# Patient Record
Sex: MALE | Race: AMERICAN INDIAN OR ALASKA NATIVE
[De-identification: names, ages, dates, MRNs, and addresses within clinical notes are randomized per-mention and may not be internally consistent; named-entity substitution may affect disease eponyms.]

---

## 2020-04-21 ENCOUNTER — HOSPITAL ENCOUNTER (EMERGENCY)
Dept: HOSPITAL 5 - ED | Age: 35
Discharge: LEFT BEFORE DISCHARGE | End: 2020-04-21
Payer: SELF-PAY

## 2020-04-21 VITALS — DIASTOLIC BLOOD PRESSURE: 90 MMHG | SYSTOLIC BLOOD PRESSURE: 158 MMHG

## 2020-04-21 DIAGNOSIS — Z02.79: Primary | ICD-10-CM

## 2020-04-21 PROCEDURE — 99281 EMR DPT VST MAYX REQ PHY/QHP: CPT

## 2020-04-21 NOTE — EMERGENCY DEPARTMENT REPORT
ED Medical Clearance HPI





- General


Chief complaint: Medical Clearance


Stated complaint: COLD


Time Seen by Provider: 04/21/20 10:32


Source: patient


Mode of arrival: Ambulatory





- History of Present Illness


Initial comments: 





This is a 34-year-old male nontoxic, well nourished in appearance, no acute 

signs of distress presents to the ED for a work excuse note to return to work.  

Patient stated he had some nausea but has resolved.  Patient otherwise denies 

any URI symptoms or any complaints.  Patient currently denies any symptoms and 

stated all symptoms are resolved at work is requesting for a return back to work

note.  Patient denies any chest pain, shortness of breath, fever, chills, 

nausea, vomiting, headache or stiff neck.  Patient denies any allergies or 

significant past medical history.


MD Complaint: medical clearance request


-: days(s)


Reason for Medical Clearance: other (work)


Alledged Intoxication: No


Traumatic Symptoms: denies traumatic injury


Associated Symptoms: denies other symptoms.  denies: chest pain, shortness of 

breath, palpitations, diaphoresis, confusion, cough, fever/chills, headaches, 

anorexia, malaise, nausea/vomiting, rash, seizure, syncope, weakness





ED Review of Systems


ROS: 


Stated complaint: COLD


Other details as noted in HPI





Constitutional: denies: chills, fever


Eyes: denies: eye pain, eye discharge, vision change


ENT: denies: ear pain, throat pain


Respiratory: denies: cough, shortness of breath, wheezing


Cardiovascular: denies: chest pain, palpitations


Endocrine: no symptoms reported


Gastrointestinal: denies: abdominal pain, nausea, diarrhea


Genitourinary: denies: urgency, dysuria


Musculoskeletal: denies: back pain, joint swelling, arthralgia


Skin: denies: rash, lesions


Neurological: denies: headache, weakness, paresthesias


Psychiatric: denies: anxiety, depression


Hematological/Lymphatic: denies: easy bleeding, easy bruising





ED Past Medical Hx





- Past Medical History


Previous Medical History?: No





- Surgical History


Past Surgical History?: No





- Social History


Smoking Status: Never Smoker


Substance Use Type: Alcohol





ED Physical Exam





- General


Limitations: No Limitations


General appearance: alert, in no apparent distress





- Head


Head exam: Present: atraumatic, normocephalic





- Eye


Eye exam: Present: normal appearance





- Neck


Neck exam: Present: normal inspection, full ROM.  Absent: tenderness, 

meningismus, lymphadenopathy





- Respiratory


Respiratory exam: Present: normal lung sounds bilaterally.  Absent: respiratory 

distress, wheezes, rales, rhonchi, stridor, chest wall tenderness, accessory 

muscle use, decreased breath sounds, prolonged expiratory





- Cardiovascular


Cardiovascular Exam: Present: regular rate, normal rhythm, normal heart sounds. 

Absent: bradycardia, tachycardia, irregular rhythm, systolic murmur, diastolic 

murmur, rubs, gallop





- GI/Abdominal


GI/Abdominal exam: Present: soft, normal bowel sounds.  Absent: distended, 

tenderness, guarding, rebound, rigid, diminished bowel sounds





- Extremities Exam


Extremities exam: Present: normal inspection, full ROM





- Back Exam


Back exam: Present: normal inspection, full ROM.  Absent: tenderness, CVA 

tenderness (R), CVA tenderness (L), muscle spasm, paraspinal tenderness, 

vertebral tenderness, rash noted





- Neurological Exam


Neurological exam: Present: alert, oriented X3, normal gait





- Psychiatric


Psychiatric exam: Present: normal affect, normal mood





- Skin


Skin exam: Present: warm, dry, intact, normal color.  Absent: rash





ED Course





                                   Vital Signs











  04/21/20





  10:24


 


Temperature 97.8 F


 


Pulse Rate 89


 


Respiratory 16





Rate 


 


Blood Pressure 158/90


 


O2 Sat by Pulse 97





Oximetry 














- Reevaluation(s)


Reevaluation #1: 





04/21/20 11:03


Patient is speaking in full sentences with no signs of distress noted.





ED Medical Decision Making





- Medical Decision Making





34-year-old male that presents with a work excuse for medical clearance.  

Patient is stable and was examined by me.  Vital signs are stable.  Normal 

physical exam.  Patient was instructed to follow-up with a primary care doctor 

in 3-5 days or if symptoms worsen and continue return to emergency room as soon 

as possible.  At time of discharge, the patient does not seem toxic or ill in 

appearance.  No acute signs of distress noted.  Patient agrees to discharge 

treatment plan of care.  No further questions noted by the patient.





ED Disposition


Clinical Impression: 


 Encounter to obtain excuse from work





Disposition: Z-07 MED SCREENING EXAM-LEFT


Is pt being admited?: No


Does the pt Need Aspirin: No


Condition: Stable


Additional Instructions: 


Follow-up with a primary care doctor in 3-5 days or if symptoms worsen and 

continue return to emergency room as soon as possible. 


Referrals: 


PRIMARY CARE,MD [Primary Care Provider] - 3-5 Days


CARBUCCIA,TALHA, MD [Staff Physician] - 3-5 Days


[facility] [Provider Group] - 3-5 Days


Forms:  Work/School Release Form(ED)